# Patient Record
Sex: FEMALE | Race: WHITE | NOT HISPANIC OR LATINO | Employment: FULL TIME | URBAN - METROPOLITAN AREA
[De-identification: names, ages, dates, MRNs, and addresses within clinical notes are randomized per-mention and may not be internally consistent; named-entity substitution may affect disease eponyms.]

---

## 2017-07-18 ENCOUNTER — HOSPITAL ENCOUNTER (OUTPATIENT)
Dept: FAMILY MEDICINE CLINIC | Facility: CLINIC | Age: 47
Setting detail: SPECIMEN
Discharge: HOME | End: 2017-07-18
Attending: INTERNAL MEDICINE | Admitting: INTERNAL MEDICINE

## 2017-07-19 LAB
BASOPHILS # BLD AUTO: 0.1 10*3/UL (ref 0–0.2)
BASOPHILS NFR BLD AUTO: 1 % (ref 0–2)
DIFFERENTIAL METHOD BLD: (no result)
EOSINOPHIL # BLD AUTO: 0.3 10*3/UL (ref 0–0.3)
EOSINOPHIL # BLD AUTO: 3 % (ref 0–3)
ERYTHROCYTE [DISTWIDTH] IN BLOOD BY AUTOMATED COUNT: 12.9 % (ref 11.5–14.5)
HCT VFR BLD AUTO: 41.9 % (ref 35–49)
HGB BLD-MCNC: 14.2 G/DL (ref 12–15)
LYMPHOCYTES # BLD AUTO: 2.1 10*3/UL (ref 0.8–4.8)
LYMPHOCYTES NFR BLD AUTO: 22 % (ref 18–42)
MCH RBC QN AUTO: 32 PG (ref 26–32)
MCHC RBC AUTO-ENTMCNC: 33.8 G/DL (ref 32–36)
MCV RBC AUTO: 94.5 FL (ref 80–94)
MONOCYTES # BLD AUTO: 0.5 10*3/UL (ref 0.1–1.3)
MONOCYTES NFR BLD AUTO: 5 % (ref 2–11)
NEUTROPHILS # BLD AUTO: 6.7 10*3/UL (ref 2.3–8.6)
NEUTROPHILS NFR BLD AUTO: 69 % (ref 50–75)
NRBC BLD AUTO-RTO: 0 /100{WBCS}
NRBC/RBC NFR BLD MANUAL: 0 10*3/UL
PLATELET # BLD AUTO: 298 10*3/UL (ref 150–450)
PMV BLD AUTO: 7.9 FL (ref 7.4–10.4)
RBC # BLD AUTO: 4.43 10*6/UL (ref 4–5.4)
T3 SERPL-MCNC: 0.83 NG/ML (ref 0.87–1.78)
T4 FREE SERPL-MCNC: 0.72 NG/DL (ref 0.58–1.64)
TSH SERPL-ACNC: 3.93 UIU/ML (ref 0.34–5.6)
WBC # BLD AUTO: 9.7 10*3/UL (ref 4.5–11.5)

## 2018-02-20 ENCOUNTER — HOSPITAL ENCOUNTER (OUTPATIENT)
Dept: FAMILY MEDICINE CLINIC | Facility: CLINIC | Age: 48
Setting detail: SPECIMEN
Discharge: HOME | End: 2018-02-20
Attending: INTERNAL MEDICINE | Admitting: INTERNAL MEDICINE

## 2018-02-20 LAB
BACTERIA SPEC AEROBE CULT: NORMAL
Lab: NORMAL
MICRO REPORT STATUS: NORMAL
SPECIMEN SOURCE: NORMAL

## 2019-07-30 ENCOUNTER — TELEPHONE (OUTPATIENT)
Dept: FAMILY MEDICINE CLINIC | Facility: CLINIC | Age: 49
End: 2019-07-30

## 2019-08-15 ENCOUNTER — OFFICE VISIT (OUTPATIENT)
Dept: FAMILY MEDICINE CLINIC | Facility: CLINIC | Age: 49
End: 2019-08-15

## 2019-08-15 ENCOUNTER — LAB (OUTPATIENT)
Dept: FAMILY MEDICINE CLINIC | Facility: CLINIC | Age: 49
End: 2019-08-15

## 2019-08-15 VITALS
HEART RATE: 78 BPM | HEIGHT: 67 IN | OXYGEN SATURATION: 98 % | BODY MASS INDEX: 29.76 KG/M2 | WEIGHT: 189.6 LBS | DIASTOLIC BLOOD PRESSURE: 60 MMHG | TEMPERATURE: 99.1 F | SYSTOLIC BLOOD PRESSURE: 114 MMHG | RESPIRATION RATE: 16 BRPM

## 2019-08-15 DIAGNOSIS — E03.9 ACQUIRED HYPOTHYROIDISM: Primary | ICD-10-CM

## 2019-08-15 DIAGNOSIS — E04.1 THYROID NODULE: ICD-10-CM

## 2019-08-15 DIAGNOSIS — D35.1 PARATHYROID ADENOMA: ICD-10-CM

## 2019-08-15 DIAGNOSIS — E03.9 ACQUIRED HYPOTHYROIDISM: ICD-10-CM

## 2019-08-15 LAB
ALBUMIN SERPL-MCNC: 4.2 G/DL (ref 3.5–4.8)
ALBUMIN/GLOB SERPL: 1.5 G/DL (ref 1–1.7)
ALP SERPL-CCNC: 59 U/L (ref 32–91)
ALT SERPL W P-5'-P-CCNC: 12 U/L (ref 14–54)
ANION GAP SERPL CALCULATED.3IONS-SCNC: 13.6 MMOL/L (ref 5–15)
AST SERPL-CCNC: 15 U/L (ref 15–41)
BILIRUB SERPL-MCNC: 0.6 MG/DL (ref 0.3–1.2)
BUN BLD-MCNC: 11 MG/DL (ref 8–20)
BUN/CREAT SERPL: 12.2 (ref 5.4–26.2)
CA-I SERPL ISE-MCNC: 1.29 MMOL/L (ref 1.2–1.3)
CALCIUM SPEC-SCNC: 9.3 MG/DL (ref 8.9–10.3)
CHLORIDE SERPL-SCNC: 103 MMOL/L (ref 101–111)
CO2 SERPL-SCNC: 26 MMOL/L (ref 22–32)
CREAT BLD-MCNC: 0.9 MG/DL (ref 0.4–1)
GFR SERPL CREATININE-BSD FRML MDRD: 67 ML/MIN/1.73
GLOBULIN UR ELPH-MCNC: 2.8 GM/DL (ref 2.5–3.8)
GLUCOSE BLD-MCNC: 84 MG/DL (ref 65–99)
POTASSIUM BLD-SCNC: 3.6 MMOL/L (ref 3.6–5.1)
PROT SERPL-MCNC: 7 G/DL (ref 6.1–7.9)
SODIUM BLD-SCNC: 139 MMOL/L (ref 136–144)
T4 FREE SERPL-MCNC: 0.93 NG/DL (ref 0.58–1.64)
TSH SERPL DL<=0.05 MIU/L-ACNC: 5.87 MIU/ML (ref 0.34–5.6)

## 2019-08-15 PROCEDURE — 80053 COMPREHEN METABOLIC PANEL: CPT | Performed by: INTERNAL MEDICINE

## 2019-08-15 PROCEDURE — 84443 ASSAY THYROID STIM HORMONE: CPT | Performed by: INTERNAL MEDICINE

## 2019-08-15 PROCEDURE — 84439 ASSAY OF FREE THYROXINE: CPT | Performed by: INTERNAL MEDICINE

## 2019-08-15 PROCEDURE — 36415 COLL VENOUS BLD VENIPUNCTURE: CPT | Performed by: INTERNAL MEDICINE

## 2019-08-15 PROCEDURE — 99213 OFFICE O/P EST LOW 20 MIN: CPT | Performed by: INTERNAL MEDICINE

## 2019-08-15 PROCEDURE — 82330 ASSAY OF CALCIUM: CPT | Performed by: INTERNAL MEDICINE

## 2019-08-15 RX ORDER — LEVOTHYROXINE AND LIOTHYRONINE 19; 4.5 UG/1; UG/1
30 TABLET ORAL DAILY
Qty: 30 TABLET | Refills: 5 | Status: SHIPPED | OUTPATIENT
Start: 2019-08-15 | End: 2020-02-02

## 2019-08-15 NOTE — PROGRESS NOTES
"Rooming Tab(CC,VS,Pt Hx,Fall Screen)  Chief Complaint   Patient presents with   • Hypothyroidism       Subjective    Pt here for thyroid follow up- has not been on meds for 3 weeks-  Insurance not paying for synthroid anymore and the generic caused her issues.  More swelling-  And more tired in last couple of weeks.  Sister recently had parathyroid removed- and mother did 20 years ago both had benign adenomas.   No constipation-  But drinks 2 cups of coffee.   I have reviewed and updated her medications, medical history and problem list during today's office visit.     Patient Care Team:  Selam Hernandez MD as PCP - General (Internal Medicine)    Problem List Tab  Medications Tab  Synopsis Tab  Chart Review Tab  Care Everywhere Tab  Immunizations Tab  Patient History Tab    Social History     Tobacco Use   • Smoking status: Not on file   Substance Use Topics   • Alcohol use: Not on file       Review of Systems   Constitutional: Negative for fatigue.   Respiratory: Negative for wheezing.    Cardiovascular: Positive for palpitations and leg swelling. Negative for chest pain.   Gastrointestinal: Negative for GERD.       Objective     Rooming Tab(CC,VS,Pt Hx,Fall Screen)  /60 (BP Location: Left arm, Patient Position: Sitting)   Pulse 78   Temp 99.1 °F (37.3 °C) (Oral)   Resp 16   Ht 169.2 cm (66.6\")   Wt 86 kg (189 lb 9.6 oz)   LMP 08/08/2019 (Exact Date)   SpO2 98%   BMI 30.05 kg/m²     Body mass index is 30.05 kg/m².    Physical Exam   Constitutional: She appears well-developed and well-nourished.   HENT:   Head: Normocephalic.   Eyes: Pupils are equal, round, and reactive to light.   Cardiovascular: Normal rate and regular rhythm.   No murmur heard.  Pulmonary/Chest: Effort normal and breath sounds normal. No respiratory distress.   Abdominal: Soft. Bowel sounds are normal.   Skin: Skin is warm.   Nursing note and vitals reviewed.       Statin Choice Calculator  Data Reviewed:               Lab " Results   Component Value Date    BUN 11 08/15/2019    CREATININE 0.90 08/15/2019    EGFRIFNONA 67 08/15/2019     08/15/2019    K 3.6 08/15/2019     08/15/2019    CALCIUM 9.3 08/15/2019    ALBUMIN 4.20 08/15/2019    BILITOT 0.6 08/15/2019    ALKPHOS 59 08/15/2019    AST 15 08/15/2019    ALT 12 (L) 08/15/2019    TSH 5.870 (H) 08/15/2019    FREET4 0.93 08/15/2019      Assessment/Plan   Order Review Tab  Health Maintenance Tab  Patient Plan/Order Tab  Diagnoses and all orders for this visit:    1. Acquired hypothyroidism (Primary)  -     TSH  -     T4, free; Future  -     Comprehensive Metabolic Panel  -     Calcium, Ionized    2. Parathyroid adenoma  -     Calcium, Ionized    3. Thyroid nodule    Other orders  -     Thyroid (ARMOUR THYROID) 30 MG PO tablet; Take 1 tablet by mouth Daily.  Dispense: 30 tablet; Refill: 5        Wrapup Tab  Return in about 6 months (around 2/15/2020).       They were informed of the diagnosis and treatment plan and directed to f/u for any further problems or concerns.

## 2020-01-23 ENCOUNTER — TELEPHONE (OUTPATIENT)
Dept: FAMILY MEDICINE CLINIC | Facility: CLINIC | Age: 50
End: 2020-01-23

## 2020-01-23 DIAGNOSIS — E03.9 ACQUIRED HYPOTHYROIDISM: Primary | ICD-10-CM

## 2020-01-23 NOTE — TELEPHONE ENCOUNTER
Patient scheduled a physical and pap on 3/27, the first available.  She feels she needs to have her thyroid labwork done sooner under [facility] because her thyroid is off and she knows her dose needs to be adjusted.  Will you order [facility] labwork?  Let me know so I can call her back.

## 2020-01-29 ENCOUNTER — LAB (OUTPATIENT)
Dept: FAMILY MEDICINE CLINIC | Facility: CLINIC | Age: 50
End: 2020-01-29

## 2020-01-29 DIAGNOSIS — E03.9 ACQUIRED HYPOTHYROIDISM: ICD-10-CM

## 2020-01-29 PROCEDURE — 84443 ASSAY THYROID STIM HORMONE: CPT | Performed by: INTERNAL MEDICINE

## 2020-01-29 PROCEDURE — 84439 ASSAY OF FREE THYROXINE: CPT | Performed by: INTERNAL MEDICINE

## 2020-01-30 LAB
T4 FREE SERPL-MCNC: 1.09 NG/DL (ref 0.93–1.7)
TSH SERPL DL<=0.05 MIU/L-ACNC: 5.15 UIU/ML (ref 0.27–4.2)

## 2020-02-02 RX ORDER — LEVOTHYROXINE AND LIOTHYRONINE 19; 4.5 UG/1; UG/1
45 TABLET ORAL DAILY
Qty: 45 TABLET | Refills: 3 | Status: SHIPPED | OUTPATIENT
Start: 2020-02-02 | End: 2021-07-16

## 2020-02-02 NOTE — PROGRESS NOTES
Please call the patient regarding her abnormal result. Thyroid is still under active will send in Rx for higher armour to pharmacy

## 2020-03-27 ENCOUNTER — OFFICE VISIT (OUTPATIENT)
Dept: FAMILY MEDICINE CLINIC | Facility: CLINIC | Age: 50
End: 2020-03-27

## 2020-03-27 ENCOUNTER — LAB (OUTPATIENT)
Dept: FAMILY MEDICINE CLINIC | Facility: CLINIC | Age: 50
End: 2020-03-27

## 2020-03-27 VITALS
TEMPERATURE: 97.8 F | BODY MASS INDEX: 33.78 KG/M2 | OXYGEN SATURATION: 98 % | DIASTOLIC BLOOD PRESSURE: 74 MMHG | SYSTOLIC BLOOD PRESSURE: 106 MMHG | HEART RATE: 83 BPM | WEIGHT: 215.2 LBS | RESPIRATION RATE: 16 BRPM | HEIGHT: 67 IN

## 2020-03-27 DIAGNOSIS — Z01.419 NORMAL PELVIC EXAM: ICD-10-CM

## 2020-03-27 DIAGNOSIS — E03.9 ACQUIRED HYPOTHYROIDISM: ICD-10-CM

## 2020-03-27 DIAGNOSIS — Z00.00 PREVENTATIVE HEALTH CARE: Primary | ICD-10-CM

## 2020-03-27 DIAGNOSIS — E55.9 VITAMIN D DEFICIENCY DISEASE: ICD-10-CM

## 2020-03-27 LAB
BILIRUB BLD-MCNC: NEGATIVE MG/DL
CLARITY, POC: CLEAR
COLOR UR: YELLOW
GLUCOSE UR STRIP-MCNC: NEGATIVE MG/DL
KETONES UR QL: NEGATIVE
LEUKOCYTE EST, POC: ABNORMAL
NITRITE UR-MCNC: NEGATIVE MG/ML
PH UR: 7 [PH] (ref 5–8)
PROT UR STRIP-MCNC: NEGATIVE MG/DL
RBC # UR STRIP: ABNORMAL /UL
SP GR UR: 1.01 (ref 1–1.03)
UROBILINOGEN UR QL: NORMAL

## 2020-03-27 PROCEDURE — 90715 TDAP VACCINE 7 YRS/> IM: CPT | Performed by: INTERNAL MEDICINE

## 2020-03-27 PROCEDURE — 80061 LIPID PANEL: CPT | Performed by: INTERNAL MEDICINE

## 2020-03-27 PROCEDURE — 80053 COMPREHEN METABOLIC PANEL: CPT | Performed by: INTERNAL MEDICINE

## 2020-03-27 PROCEDURE — 90471 IMMUNIZATION ADMIN: CPT | Performed by: INTERNAL MEDICINE

## 2020-03-27 PROCEDURE — 81003 URINALYSIS AUTO W/O SCOPE: CPT | Performed by: INTERNAL MEDICINE

## 2020-03-27 PROCEDURE — 96372 THER/PROPH/DIAG INJ SC/IM: CPT | Performed by: INTERNAL MEDICINE

## 2020-03-27 PROCEDURE — 84439 ASSAY OF FREE THYROXINE: CPT | Performed by: INTERNAL MEDICINE

## 2020-03-27 PROCEDURE — 36415 COLL VENOUS BLD VENIPUNCTURE: CPT | Performed by: INTERNAL MEDICINE

## 2020-03-27 PROCEDURE — 82306 VITAMIN D 25 HYDROXY: CPT | Performed by: INTERNAL MEDICINE

## 2020-03-27 PROCEDURE — 84443 ASSAY THYROID STIM HORMONE: CPT | Performed by: INTERNAL MEDICINE

## 2020-03-27 PROCEDURE — 99396 PREV VISIT EST AGE 40-64: CPT | Performed by: INTERNAL MEDICINE

## 2020-03-27 RX ORDER — CYANOCOBALAMIN 1000 UG/ML
1000 INJECTION, SOLUTION INTRAMUSCULAR; SUBCUTANEOUS
Status: SHIPPED | OUTPATIENT
Start: 2020-03-27

## 2020-03-27 RX ADMIN — CYANOCOBALAMIN 1000 MCG: 1000 INJECTION, SOLUTION INTRAMUSCULAR; SUBCUTANEOUS at 14:53

## 2020-03-27 NOTE — PROGRESS NOTES
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  Has new grandchild that lives with them.    increased weight gain    getting hot flashes and night sweats comes and goes- more dryness. Now.   no depression- some anxiety but manageable.   I have reviewed and updated her medications, medical history and problem list during today's office visit.     Patient Care Team:  Selam Hernandez MD as PCP - General (Internal Medicine)    Problem List Tab  Medications Tab  Synopsis Tab  Chart Review Tab  Care Everywhere Tab  Immunizations Tab  Patient History Tab    Social History     Tobacco Use   • Smoking status: Never Smoker   • Smokeless tobacco: Never Used   Substance Use Topics   • Alcohol use: Not on file       Review of Systems   Constitutional: Positive for fatigue and unexpected weight gain. Negative for fever.   HENT: Negative for congestion.    Respiratory: Negative for apnea, cough and wheezing.    Cardiovascular: Negative for chest pain.   Gastrointestinal: Negative for abdominal distention.   Musculoskeletal: Positive for arthralgias.   Neurological: Negative for syncope.   Psychiatric/Behavioral: Negative for behavioral problems and sleep disturbance.       Objective     Rooming Tab(CC,VS,Pt Hx,Fall Screen)  /74 (BP Location: Left arm, Patient Position: Sitting, Cuff Size: Large Adult)   Pulse 83   Temp 97.8 °F (36.6 °C) (Oral)   Resp 16   Ht 170.2 cm (67\")   Wt 97.6 kg (215 lb 3.2 oz)   SpO2 98%   BMI 33.71 kg/m²     Body mass index is 33.71 kg/m².    Physical Exam   Constitutional: She is oriented to person, place, and time. She appears well-developed and well-nourished.   HENT:   Head: Normocephalic and atraumatic.   Right Ear: External ear normal.   Left Ear: External ear normal.   Mouth/Throat: Oropharynx is clear and moist.   Eyes: " Pupils are equal, round, and reactive to light. Conjunctivae and EOM are normal.   Neck: Normal range of motion. Neck supple.   Cardiovascular: Normal rate, regular rhythm, normal heart sounds and intact distal pulses.   Pulmonary/Chest: Effort normal and breath sounds normal. Right breast exhibits no inverted nipple, no mass and no tenderness. Left breast exhibits no inverted nipple, no mass and no tenderness. No breast discharge.   Abdominal: Soft. Bowel sounds are normal. There is no tenderness.   Genitourinary: Rectal exam shows no mass. No breast discharge. There is no rash, tenderness or lesion on the right labia. Uterus is not enlarged. Cervix exhibits no motion tenderness. Right adnexum displays no fullness. Left adnexum displays no fullness.   Musculoskeletal: Normal range of motion.   Neurological: She is oriented to person, place, and time.   Skin: Skin is warm.   Psychiatric: She has a normal mood and affect.        Statin Choice Calculator  Data Reviewed:               Lab Results   Component Value Date    TSH 5.150 (H) 01/29/2020    FREET4 1.09 01/29/2020      Assessment/Plan   Order Review Tab  Health Maintenance Tab  Patient Plan/Order Tab  Diagnoses and all orders for this visit:    1. Preventative health care (Primary)  Assessment & Plan:  discussed all recommendations    Orders:  -     Comprehensive Metabolic Panel  -     Lipid Panel  -     cyanocobalamin injection 1,000 mcg  -     Mammo Screening Digital Tomosynthesis Bilateral With CAD; Future    2. Acquired hypothyroidism  Comments:  to change back to synthroid awaiting lab results  Orders:  -     TSH  -     T4, free; Future    3. Normal pelvic exam  -     POC Urinalysis Dipstick, Automated  -     IGP,Aptima HPV,Age Gdln; Future    4. Vitamin D deficiency disease  -     Vitamin D 25 Hydroxy    Other orders  -     Tdap Vaccine Greater Than or Equal To 8yo IM      Wrapup Tab  Return in about 1 year (around 3/27/2021) for Annual physical.        During this visit for their annual exam, we reviewed their personal history, social history and family history.  We went over their medications and all the recommended health maintenence items for their age group. They were given the opportunity to ask questions and discuss other concerns.

## 2020-03-28 LAB
25(OH)D3 SERPL-MCNC: 22 NG/ML (ref 30–100)
ALBUMIN SERPL-MCNC: 4.4 G/DL (ref 3.5–5.2)
ALBUMIN/GLOB SERPL: 1.6 G/DL
ALP SERPL-CCNC: 66 U/L (ref 39–117)
ALT SERPL W P-5'-P-CCNC: 16 U/L (ref 1–33)
ANION GAP SERPL CALCULATED.3IONS-SCNC: 11.3 MMOL/L (ref 5–15)
AST SERPL-CCNC: 11 U/L (ref 1–32)
BILIRUB SERPL-MCNC: 0.3 MG/DL (ref 0.2–1.2)
BUN BLD-MCNC: 11 MG/DL (ref 6–20)
BUN/CREAT SERPL: 14.7 (ref 7–25)
CALCIUM SPEC-SCNC: 9.1 MG/DL (ref 8.6–10.5)
CHLORIDE SERPL-SCNC: 103 MMOL/L (ref 98–107)
CHOLEST SERPL-MCNC: 177 MG/DL (ref 0–200)
CO2 SERPL-SCNC: 23.7 MMOL/L (ref 22–29)
CREAT BLD-MCNC: 0.75 MG/DL (ref 0.57–1)
GFR SERPL CREATININE-BSD FRML MDRD: 82 ML/MIN/1.73
GLOBULIN UR ELPH-MCNC: 2.8 GM/DL
GLUCOSE BLD-MCNC: 89 MG/DL (ref 65–99)
HDLC SERPL-MCNC: 70 MG/DL (ref 40–60)
LDLC SERPL CALC-MCNC: 89 MG/DL (ref 0–100)
LDLC/HDLC SERPL: 1.27 {RATIO}
POTASSIUM BLD-SCNC: 4.2 MMOL/L (ref 3.5–5.2)
PROT SERPL-MCNC: 7.2 G/DL (ref 6–8.5)
SODIUM BLD-SCNC: 138 MMOL/L (ref 136–145)
T4 FREE SERPL-MCNC: 1.19 NG/DL (ref 0.93–1.7)
TRIGL SERPL-MCNC: 91 MG/DL (ref 0–150)
TSH SERPL DL<=0.05 MIU/L-ACNC: 4.96 UIU/ML (ref 0.27–4.2)
VLDLC SERPL-MCNC: 18.2 MG/DL (ref 5–40)

## 2020-03-30 ENCOUNTER — TELEPHONE (OUTPATIENT)
Dept: FAMILY MEDICINE CLINIC | Facility: CLINIC | Age: 50
End: 2020-03-30

## 2020-03-30 RX ORDER — LEVOTHYROXINE SODIUM 0.1 MG/1
100 TABLET ORAL DAILY
Qty: 90 TABLET | Refills: 1 | Status: SHIPPED | OUTPATIENT
Start: 2020-03-30 | End: 2020-10-05

## 2020-03-30 NOTE — TELEPHONE ENCOUNTER
----- Message from Selam Hernandez MD sent at 3/30/2020 10:45 AM EDT -----  Please call the patient regarding her abnormal result. Please tell pt that thyroid is still underactive- so changing to 100mcg of synthroid- wants to recheck in 8-12 weeks.   Patients Vitamin D  Is low.  They need to start 5000 units daily of D3.  Can get OTC and recheck in 3 months

## 2020-03-30 NOTE — PROGRESS NOTES
Please call the patient regarding her abnormal result. Please tell pt that thyroid is still underactive- so changing to 100mcg of synthroid- wants to recheck in 8-12 weeks.   Patients Vitamin D  Is low.  They need to start 5000 units daily of D3.  Can get OTC and recheck in 3 months

## 2020-03-31 LAB
AGE GDLN ACOG TESTING: NORMAL
CHROM ANALY OVERALL INTERP-IMP: NORMAL
CONV .: NORMAL
CONV PERFORMED BY:: NORMAL
DX ICD CODE: NORMAL
HIV 1 & 2 AB SER-IMP: NORMAL
HPV I/H RISK 4 DNA CVX QL PROBE+SIG AMP: NEGATIVE
REF LAB TEST METHOD: NORMAL
STAT OF ADQ CVX/VAG CYTO-IMP: NORMAL

## 2020-10-05 RX ORDER — LEVOTHYROXINE SODIUM 0.1 MG/1
TABLET ORAL
Qty: 90 TABLET | Refills: 1 | Status: SHIPPED | OUTPATIENT
Start: 2020-10-05 | End: 2021-04-23

## 2021-03-02 ENCOUNTER — LAB (OUTPATIENT)
Dept: FAMILY MEDICINE CLINIC | Facility: CLINIC | Age: 51
End: 2021-03-02

## 2021-03-02 ENCOUNTER — TELEPHONE (OUTPATIENT)
Dept: FAMILY MEDICINE CLINIC | Facility: CLINIC | Age: 51
End: 2021-03-02

## 2021-03-02 ENCOUNTER — OFFICE VISIT (OUTPATIENT)
Dept: FAMILY MEDICINE CLINIC | Facility: CLINIC | Age: 51
End: 2021-03-02

## 2021-03-02 VITALS
HEART RATE: 109 BPM | WEIGHT: 218.4 LBS | HEIGHT: 67 IN | RESPIRATION RATE: 10 BRPM | SYSTOLIC BLOOD PRESSURE: 115 MMHG | TEMPERATURE: 97.1 F | BODY MASS INDEX: 34.28 KG/M2 | DIASTOLIC BLOOD PRESSURE: 79 MMHG

## 2021-03-02 DIAGNOSIS — N93.8 DUB (DYSFUNCTIONAL UTERINE BLEEDING): ICD-10-CM

## 2021-03-02 DIAGNOSIS — E53.8 B12 DEFICIENCY: ICD-10-CM

## 2021-03-02 DIAGNOSIS — Z01.419 ENCOUNTER FOR ANNUAL ROUTINE GYNECOLOGICAL EXAMINATION: Primary | ICD-10-CM

## 2021-03-02 DIAGNOSIS — E04.1 THYROID NODULE: ICD-10-CM

## 2021-03-02 DIAGNOSIS — E03.9 ACQUIRED HYPOTHYROIDISM: ICD-10-CM

## 2021-03-02 DIAGNOSIS — Z01.419 ENCOUNTER FOR ANNUAL ROUTINE GYNECOLOGICAL EXAMINATION: ICD-10-CM

## 2021-03-02 LAB
ALBUMIN SERPL-MCNC: 4.3 G/DL (ref 3.5–5.2)
ALBUMIN/GLOB SERPL: 1.7 G/DL
ALP SERPL-CCNC: 70 U/L (ref 39–117)
ALT SERPL W P-5'-P-CCNC: 14 U/L (ref 1–33)
ANION GAP SERPL CALCULATED.3IONS-SCNC: 8.9 MMOL/L (ref 5–15)
AST SERPL-CCNC: 11 U/L (ref 1–32)
BASOPHILS # BLD AUTO: 0.03 10*3/MM3 (ref 0–0.2)
BASOPHILS NFR BLD AUTO: 0.4 % (ref 0–1.5)
BILIRUB SERPL-MCNC: 0.4 MG/DL (ref 0–1.2)
BUN SERPL-MCNC: 13 MG/DL (ref 6–20)
BUN/CREAT SERPL: 18.6 (ref 7–25)
CALCIUM SPEC-SCNC: 9.6 MG/DL (ref 8.6–10.5)
CHLORIDE SERPL-SCNC: 102 MMOL/L (ref 98–107)
CO2 SERPL-SCNC: 27.1 MMOL/L (ref 22–29)
CREAT SERPL-MCNC: 0.7 MG/DL (ref 0.57–1)
DEPRECATED RDW RBC AUTO: 42.7 FL (ref 37–54)
EOSINOPHIL # BLD AUTO: 0.1 10*3/MM3 (ref 0–0.4)
EOSINOPHIL NFR BLD AUTO: 1.3 % (ref 0.3–6.2)
ERYTHROCYTE [DISTWIDTH] IN BLOOD BY AUTOMATED COUNT: 12.1 % (ref 12.3–15.4)
GFR SERPL CREATININE-BSD FRML MDRD: 89 ML/MIN/1.73
GLOBULIN UR ELPH-MCNC: 2.5 GM/DL
GLUCOSE SERPL-MCNC: 92 MG/DL (ref 65–99)
HCT VFR BLD AUTO: 43.1 % (ref 34–46.6)
HGB BLD-MCNC: 14.6 G/DL (ref 12–15.9)
IMM GRANULOCYTES # BLD AUTO: 0.01 10*3/MM3 (ref 0–0.05)
IMM GRANULOCYTES NFR BLD AUTO: 0.1 % (ref 0–0.5)
LYMPHOCYTES # BLD AUTO: 2.41 10*3/MM3 (ref 0.7–3.1)
LYMPHOCYTES NFR BLD AUTO: 32 % (ref 19.6–45.3)
MCH RBC QN AUTO: 32.6 PG (ref 26.6–33)
MCHC RBC AUTO-ENTMCNC: 33.9 G/DL (ref 31.5–35.7)
MCV RBC AUTO: 96.2 FL (ref 79–97)
MONOCYTES # BLD AUTO: 0.48 10*3/MM3 (ref 0.1–0.9)
MONOCYTES NFR BLD AUTO: 6.4 % (ref 5–12)
NEUTROPHILS NFR BLD AUTO: 4.51 10*3/MM3 (ref 1.7–7)
NEUTROPHILS NFR BLD AUTO: 59.8 % (ref 42.7–76)
NRBC BLD AUTO-RTO: 0 /100 WBC (ref 0–0.2)
PLATELET # BLD AUTO: 293 10*3/MM3 (ref 140–450)
PMV BLD AUTO: 9 FL (ref 6–12)
POTASSIUM SERPL-SCNC: 4.1 MMOL/L (ref 3.5–5.2)
PROT SERPL-MCNC: 6.8 G/DL (ref 6–8.5)
RBC # BLD AUTO: 4.48 10*6/MM3 (ref 3.77–5.28)
SODIUM SERPL-SCNC: 138 MMOL/L (ref 136–145)
T4 FREE SERPL-MCNC: 1.37 NG/DL (ref 0.93–1.7)
TSH SERPL DL<=0.05 MIU/L-ACNC: 3.14 UIU/ML (ref 0.27–4.2)
WBC # BLD AUTO: 7.54 10*3/MM3 (ref 3.4–10.8)

## 2021-03-02 PROCEDURE — 84439 ASSAY OF FREE THYROXINE: CPT | Performed by: INTERNAL MEDICINE

## 2021-03-02 PROCEDURE — 99396 PREV VISIT EST AGE 40-64: CPT | Performed by: INTERNAL MEDICINE

## 2021-03-02 PROCEDURE — 36415 COLL VENOUS BLD VENIPUNCTURE: CPT

## 2021-03-02 PROCEDURE — 80053 COMPREHEN METABOLIC PANEL: CPT | Performed by: INTERNAL MEDICINE

## 2021-03-02 PROCEDURE — 84443 ASSAY THYROID STIM HORMONE: CPT | Performed by: INTERNAL MEDICINE

## 2021-03-02 PROCEDURE — 85025 COMPLETE CBC W/AUTO DIFF WBC: CPT | Performed by: INTERNAL MEDICINE

## 2021-03-02 RX ORDER — CYANOCOBALAMIN 1000 UG/ML
1000 INJECTION, SOLUTION INTRAMUSCULAR; SUBCUTANEOUS
Status: SHIPPED | OUTPATIENT
Start: 2021-03-02

## 2021-03-02 NOTE — PROGRESS NOTES
"Rooming Tab(CC,VS,Pt Hx,Fall Screen)  Chief Complaint   Patient presents with   • Vaginal Bleeding       Subjective   Pt has been regular menses- last year seemed to get lighter but in last 6 weeks much more bleeding continuously- not huge clots.  Felt like normal period-this past week as even heavier. Feels lower back pain and abdomen heaviness- stools normal- goes daily but maybe more constipated- harder.   Some pain with deep pentration.    more fatigued-  Tyroid enlarged, and hard to lose weight  I have reviewed and updated her medications, medical history and problem list during today's office visit.     Patient Care Team:  Selam Hernandez MD as PCP - General (Internal Medicine)    Problem List Tab  Medications Tab  Synopsis Tab  Chart Review Tab  Care Everywhere Tab  Immunizations Tab  Patient History Tab    Social History     Tobacco Use   • Smoking status: Never Smoker   • Smokeless tobacco: Never Used   Substance Use Topics   • Alcohol use: Not on file       Review of Systems    Objective     Rooming Tab(CC,VS,Pt Hx,Fall Screen)  /79   Pulse 109   Temp 97.1 °F (36.2 °C)   Resp 10   Ht 170.2 cm (67\")   Wt 99.1 kg (218 lb 6.4 oz)   BMI 34.21 kg/m²     Body mass index is 34.21 kg/m².    Physical Exam  Vitals and nursing note reviewed.   Constitutional:       Appearance: Normal appearance. She is well-developed. She is obese.   HENT:      Head: Normocephalic and atraumatic.      Right Ear: Tympanic membrane and external ear normal.      Left Ear: Tympanic membrane and external ear normal.      Nose: No rhinorrhea.      Mouth/Throat:      Pharynx: No posterior oropharyngeal erythema.   Eyes:      Conjunctiva/sclera: Conjunctivae normal.      Pupils: Pupils are equal, round, and reactive to light.   Neck:      Comments: Right thyroid enlarged  Cardiovascular:      Rate and Rhythm: Normal rate and regular rhythm.      Pulses: Normal pulses.      Heart sounds: Normal heart sounds. No murmur. "   Pulmonary:      Effort: Pulmonary effort is normal.      Breath sounds: Normal breath sounds.   Chest:      Breasts:         Right: No inverted nipple, mass or tenderness.         Left: No inverted nipple, mass or tenderness.   Abdominal:      General: Bowel sounds are normal. There is no distension.      Palpations: Abdomen is soft.      Tenderness: There is no abdominal tenderness.   Genitourinary:     Labia:         Right: No rash, tenderness or lesion.       Cervix: No cervical motion tenderness.      Uterus: Not enlarged.       Adnexa:         Right: No fullness.          Left: No fullness.        Rectum: No mass.   Musculoskeletal:         General: No tenderness.      Cervical back: Normal range of motion and neck supple.   Skin:     Capillary Refill: Capillary refill takes less than 2 seconds.      Findings: No erythema.   Neurological:      General: No focal deficit present.      Mental Status: She is alert and oriented to person, place, and time.   Psychiatric:         Mood and Affect: Mood normal.         Behavior: Behavior normal.          Statin Choice Calculator  Data Reviewed:               Lab Results   Component Value Date    BUN 13 03/02/2021    CREATININE 0.70 03/02/2021    EGFRIFNONA 89 03/02/2021     03/02/2021    K 4.1 03/02/2021     03/02/2021    CALCIUM 9.6 03/02/2021    ALBUMIN 4.30 03/02/2021    BILITOT 0.4 03/02/2021    ALKPHOS 70 03/02/2021    AST 11 03/02/2021    ALT 14 03/02/2021    WBC 7.54 03/02/2021    RBC 4.48 03/02/2021    HCT 43.1 03/02/2021    MCV 96.2 03/02/2021    MCH 32.6 03/02/2021    TSH 3.140 03/02/2021    FREET4 1.37 03/02/2021      Assessment/Plan   Order Review Tab  Health Maintenance Tab  Patient Plan/Order Tab  Diagnoses and all orders for this visit:    1. Encounter for annual routine gynecological examination (Primary)  Comments:  discussed all recommendations  Orders:  -     IGP,Aptima HPV,Age Gdln; Future    2. DUB (dysfunctional uterine  bleeding)  Comments:  will check uterine US adn refer to gyn if needed  Orders:  -     US Pelvis Complete; Future  -     IGP,Aptima HPV,Age Gdln; Future  -     Comprehensive Metabolic Panel  -     CBC Auto Differential    3. Thyroid nodule  Comments:  check thryoid US  Orders:  -     US Thyroid; Future    4. Acquired hypothyroidism  -     TSH  -     T4, Free    5. B12 deficiency  -     cyanocobalamin injection 1,000 mcg    Other orders  -     progesterone (PROMETRIUM) 100 MG capsule; Take 1 capsule by mouth Daily.  Dispense: 10 capsule; Refill: 1        Wrapup Tab  Return if symptoms worsen or fail to improve.       They were informed of the diagnosis and treatment plan and directed to f/u for any further problems or concerns.

## 2021-03-05 LAB
AGE GDLN ACOG TESTING: NORMAL
CYTOLOGIST CVX/VAG CYTO: NORMAL
CYTOLOGY CVX/VAG DOC CYTO: NORMAL
CYTOLOGY CVX/VAG DOC THIN PREP: NORMAL
DX ICD CODE: NORMAL
HIV 1 & 2 AB SER-IMP: NORMAL
HPV I/H RISK 4 DNA CVX QL PROBE+SIG AMP: NEGATIVE
OTHER STN SPEC: NORMAL
STAT OF ADQ CVX/VAG CYTO-IMP: NORMAL

## 2021-03-11 DIAGNOSIS — E04.1 THYROID NODULE: ICD-10-CM

## 2021-03-11 DIAGNOSIS — N93.8 DUB (DYSFUNCTIONAL UTERINE BLEEDING): ICD-10-CM

## 2021-03-12 NOTE — PROGRESS NOTES
Please mkae sure pt can get in mychart and saw my note    The nodules on the right have grown. IT is not recommended to do a biopsy until 1cm- so I would repeat the US in 1 year and biopsy if at that time they are > 1 cm.  Pelvic US showed thickened endometrium so need to see gyn for endometrial biopsy and possibly D and C

## 2021-03-12 NOTE — PROGRESS NOTES
The nodules on the right of thyroid have grown. IT is not recommended to do a biopsy until 1cm- so I would repeat the US in 1 year and biopsy if at that time they are > 1 cm.  Pelvic US showed thickened endometrium so need to see gyn for endometrial biopsy and possibly D and C.  IF you need a referral to gyn, please let me know

## 2021-03-17 ENCOUNTER — TELEPHONE (OUTPATIENT)
Dept: FAMILY MEDICINE CLINIC | Facility: CLINIC | Age: 51
End: 2021-03-17

## 2021-03-17 NOTE — TELEPHONE ENCOUNTER
----- Message from Gabriel Don sent at 3/17/2021  3:13 PM EDT -----  Regarding: RE:bleeding  Contact: [phone]  Thanks!  I called Dr. Wyatt's office today and they did not have the referral yet and said she was about 6 weeks out, so I made an appointment with Leonardo Richter for next week.  If I need to reschedule that with Dr. Wyatt, just let me know...thanks again!

## 2021-04-23 RX ORDER — LEVOTHYROXINE SODIUM 0.1 MG/1
TABLET ORAL
Qty: 90 TABLET | Refills: 3 | Status: SHIPPED | OUTPATIENT
Start: 2021-04-23 | End: 2022-03-28

## 2021-05-23 ENCOUNTER — PATIENT MESSAGE (OUTPATIENT)
Dept: FAMILY MEDICINE CLINIC | Facility: CLINIC | Age: 51
End: 2021-05-23

## 2021-05-24 RX ORDER — DOXYCYCLINE HYCLATE 100 MG
100 TABLET ORAL
Qty: 20 TABLET | Refills: 0 | Status: SHIPPED | OUTPATIENT
Start: 2021-05-24 | End: 2021-06-03

## 2021-05-25 NOTE — TELEPHONE ENCOUNTER
From: Gabriel Don  To: Selam Hernandez MD  Sent: 5/23/2021 5:47 PM EDT  Subject: Non-Urgent Medical Question    Hi Dr. Hernandez,    I found a tick on me a few days ago that had been there a while. I've actually had several ticks on me this year, but usually find them and pull them off quickly. I haven't been feeling great the past couple of days. 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 Since it's the last week of school, I won't be able to come in until next week if you would need to see me, or calling it in is fine with me. My pharmacist is [facility] on [address] in [place].     Thanks and Congratulations to your daughter on an awesome field hockey season. (Karen Dolan filled me in on the details!)

## 2021-09-03 ENCOUNTER — TELEPHONE (OUTPATIENT)
Dept: FAMILY MEDICINE CLINIC | Facility: CLINIC | Age: 51
End: 2021-09-03

## 2021-09-03 NOTE — TELEPHONE ENCOUNTER
Caller: Davis Orosco    Relationship: Self    Best call back number: [phone]    What is the best time to reach you: ANYTIME    Who are you requesting to speak with (clinical staff, provider,  specific staff member): CLINICAL STAFF    Do you know the name of the person who called: DAVIS OROSCO    What was the call regarding: PATIENT SAID SHE WAS SEEN IN THE [facility]RGENT [facility] ABOUT 10 DAYS AGO AND DIAGNOSED WITH A URINARY TRACT INFECTION    SHE SAID THE CULTURE DID COME BACK POSITIVE FOR BACTERIA, SHE HAS BEEN TAKING A 10 DAY ANTIBIOTIC, BUT HAS NOW COMPLETED IT    SHE SAID SHE IS STILL HAVING A LOT OF PAIN WHEN USING THE BATHROOM, WANTED TO SEE IF SHE COULD BE SEEN NEXT WEEK FOR THIS OR IF SHE SHOULD DO SOMETHING ELSE     SH DID NOT DESCRIBE ANY SYMPTOMS OTHER THAN PAIN WHEN URINATING     PHARMACY: [facility] - [place], [place] [address] - [phone] [facility] [phone]   [phone]    Do you require a callback: YES

## 2021-09-13 ENCOUNTER — OFFICE VISIT (OUTPATIENT)
Dept: FAMILY MEDICINE CLINIC | Facility: CLINIC | Age: 51
End: 2021-09-13

## 2021-09-13 VITALS
SYSTOLIC BLOOD PRESSURE: 104 MMHG | RESPIRATION RATE: 10 BRPM | WEIGHT: 209.8 LBS | HEIGHT: 67 IN | DIASTOLIC BLOOD PRESSURE: 70 MMHG | HEART RATE: 73 BPM | BODY MASS INDEX: 32.93 KG/M2 | OXYGEN SATURATION: 99 %

## 2021-09-13 DIAGNOSIS — R39.15 URINARY URGENCY: ICD-10-CM

## 2021-09-13 DIAGNOSIS — R33.9 INCOMPLETE BLADDER EMPTYING: ICD-10-CM

## 2021-09-13 DIAGNOSIS — R30.0 DYSURIA: Primary | ICD-10-CM

## 2021-09-13 PROCEDURE — 99213 OFFICE O/P EST LOW 20 MIN: CPT | Performed by: NURSE PRACTITIONER

## 2021-09-13 PROCEDURE — 81003 URINALYSIS AUTO W/O SCOPE: CPT | Performed by: NURSE PRACTITIONER

## 2021-09-13 NOTE — PATIENT INSTRUCTIONS
Urinary Tract Infection, Adult    A urinary tract infection (UTI) is an infection of any part of the urinary tract. The urinary tract includes the kidneys, ureters, bladder, and urethra. These organs make, store, and get rid of urine in the body.  Your health care provider may use other names to describe the infection. An upper UTI affects the ureters and kidneys (pyelonephritis). A lower UTI affects the bladder (cystitis) and urethra (urethritis).  What are the causes?  Most urinary tract infections are caused by bacteria in your genital area, around the entrance to your urinary tract (urethra). These bacteria grow and cause inflammation of your urinary tract.  What increases the risk?  You are more likely to develop this condition if:  · You have a urinary catheter that stays in place (indwelling).  · You are not able to control when you urinate or have a bowel movement (you have incontinence).  · You are female and you:  ? Use a spermicide or diaphragm for birth control.  ? Have low estrogen levels.  ? Are pregnant.  · You have certain genes that increase your risk (genetics).  · You are sexually active.  · You take antibiotic medicines.  · You have a condition that causes your flow of urine to slow down, such as:  ? An enlarged prostate, if you are male.  ? Blockage in your urethra (stricture).  ? A kidney stone.  ? A nerve condition that affects your bladder control (neurogenic bladder).  ? Not getting enough to drink, or not urinating often.  · You have certain medical conditions, such as:  ? Diabetes.  ? A weak disease-fighting system (immunesystem).  ? Sickle cell disease.  ? Gout.  ? Spinal cord injury.  What are the signs or symptoms?  Symptoms of this condition include:  · Needing to urinate right away (urgently).  · Frequent urination or passing small amounts of urine frequently.  · Pain or burning with urination.  · Blood in the urine.  · Urine that smells bad or unusual.  · Trouble urinating.  · Cloudy  urine.  · Vaginal discharge, if you are female.  · Pain in the abdomen or the lower back.  You may also have:  · Vomiting or a decreased appetite.  · Confusion.  · Irritability or tiredness.  · A fever.  · Diarrhea.  The first symptom in older adults may be confusion. In some cases, they may not have any symptoms until the infection has worsened.  How is this diagnosed?  This condition is diagnosed based on your medical history and a physical exam. You may also have other tests, including:  · Urine tests.  · Blood tests.  · Tests for sexually transmitted infections (STIs).  If you have had more than one UTI, a cystoscopy or imaging studies may be done to determine the cause of the infections.  How is this treated?  Treatment for this condition includes:  · Antibiotic medicine.  · Over-the-counter medicines to treat discomfort.  · Drinking enough water to stay hydrated.  If you have frequent infections or have other conditions such as a kidney stone, you may need to see a health care provider who specializes in the urinary tract (urologist).  In rare cases, urinary tract infections can cause sepsis. Sepsis is a life-threatening condition that occurs when the body responds to an infection. Sepsis is treated in the hospital with IV antibiotics, fluids, and other medicines.  Follow these instructions at home:    Medicines  · Take over-the-counter and prescription medicines only as told by your health care provider.  · If you were prescribed an antibiotic medicine, take it as told by your health care provider. Do not stop using the antibiotic even if you start to feel better.  General instructions  · Make sure you:  ? Empty your bladder often and completely. Do not hold urine for long periods of time.  ? Empty your bladder after sex.  ? Wipe from front to back after a bowel movement if you are female. Use each tissue one time when you wipe.  · Drink enough fluid to keep your urine pale yellow.  · Keep all follow-up  visits as told by your health care provider. This is important.  Contact a health care provider if:  · Your symptoms do not get better after 1-2 days.  · Your symptoms go away and then return.  Get help right away if you have:  · Severe pain in your back or your lower abdomen.  · A fever.  · Nausea or vomiting.  Summary  · A urinary tract infection (UTI) is an infection of any part of the urinary tract, which includes the kidneys, ureters, bladder, and urethra.  · Most urinary tract infections are caused by bacteria in your genital area, around the entrance to your urinary tract (urethra).  · Treatment for this condition often includes antibiotic medicines.  · If you were prescribed an antibiotic medicine, take it as told by your health care provider. Do not stop using the antibiotic even if you start to feel better.  · Keep all follow-up visits as told by your health care provider. This is important.  This information is not intended to replace advice given to you by your health care provider. Make sure you discuss any questions you have with your health care provider.  Document Revised: 12/05/2019 Document Reviewed: 06/27/2019  [employer] Patient Education © 2021 [employer]

## 2021-09-13 NOTE — PROGRESS NOTES
"Chief Complaint  Urinary Tract Infection    Subjective          Gabriel YARED Don presents to [facility]  History of Present Illness  H/o uti 3-4 weeks ago, treated at urgent care (nitrofurantoin x 10 days) but continues to have some urinary symptoms and  low back pain which is constant, worse when she has to go to the bathroom  Has some urinary urgency with incontinence/leaking  Also feels like she is not emptying her bladder all of the way  Denies fever  Has had uti in the past and this feels is the same    Is trying to push fluids to help flush the bladder  Has used some azo when she had the initial symptoms    Dip u/a + blood, otherwise negative, send for c & s  Tells me that she always has blood in her urine    Review of Systems   Constitutional: Negative for fever.   Respiratory: Negative.    Gastrointestinal: Negative.    Genitourinary: Positive for dysuria, flank pain, frequency and urgency. Negative for decreased urine volume, hematuria and pelvic pain.   Neurological: Negative.        Objective   Vital Signs:   /70   Pulse 73   Resp 10   Ht 168.9 cm (66.5\")   Wt 95.2 kg (209 lb 12.8 oz)   SpO2 99%   BMI 33.36 kg/m²     Physical Exam  Vitals reviewed.   Constitutional:       Appearance: Normal appearance.   Cardiovascular:      Rate and Rhythm: Normal rate and regular rhythm.      Heart sounds: Normal heart sounds.   Pulmonary:      Effort: Pulmonary effort is normal.      Breath sounds: Normal breath sounds.   Abdominal:      General: Bowel sounds are normal.      Palpations: Abdomen is soft.      Tenderness: There is right CVA tenderness and left CVA tenderness.   Musculoskeletal:      Right lower leg: No edema.      Left lower leg: No edema.   Skin:     Capillary Refill: Capillary refill takes less than 2 seconds.   Neurological:      Mental Status: She is alert and oriented to person, place, and time.        Result Review :                 Assessment and Plan  "   Diagnoses and all orders for this visit:    1. Dysuria (Primary)  -     Urinalysis With Culture If Indicated -; Future    check urine c & s, encouraged push po fluids    Follow Up   Return if symptoms worsen or fail to improve.  Patient was given instructions and counseling regarding her condition or for health maintenance advice. Please see specific information pulled into the AVS if appropriate.

## 2021-09-14 LAB
BILIRUB UR QL STRIP: NEGATIVE
CLARITY UR: ABNORMAL
COLOR UR: YELLOW
GLUCOSE UR STRIP-MCNC: NEGATIVE MG/DL
HGB UR QL STRIP.AUTO: NEGATIVE
KETONES UR QL STRIP: ABNORMAL
LEUKOCYTE ESTERASE UR QL STRIP.AUTO: NEGATIVE
NITRITE UR QL STRIP: NEGATIVE
PH UR STRIP.AUTO: 8.5 [PH] (ref 5–8)
PROT UR QL STRIP: ABNORMAL
SP GR UR STRIP: 1.03 (ref 1–1.03)
UROBILINOGEN UR QL STRIP: ABNORMAL

## 2021-12-09 ENCOUNTER — PATIENT MESSAGE (OUTPATIENT)
Dept: FAMILY MEDICINE CLINIC | Facility: CLINIC | Age: 51
End: 2021-12-09

## 2021-12-10 RX ORDER — CIPROFLOXACIN 500 MG/1
500 TABLET, FILM COATED ORAL 2 TIMES DAILY
Qty: 10 TABLET | Refills: 0 | Status: SHIPPED | OUTPATIENT
Start: 2021-12-10

## 2021-12-10 NOTE — TELEPHONE ENCOUNTER
From: Gabriel Don  To: Selam Hernandez MD  Sent: 12/9/2021 10:30 PM EST  Subject: EEEK! Another bladder infection!    Hi Dr. Hernandez,    I pray that all is well with you. I have a bladder infection. This is my third one since having Covid in July. Not for sure if there's any correlation. I just had a CT scan, scope, and other tests done on my bladder and kidneys bc of reoccurring blood in my urine and constant urine leakage. My results were clear.    I am experiencing lots of pain (pinching/burning) when I urinate, and also cloudy and foul smelling urine. I am having some relief with Azo Standard and Tylenol. I tried to get in at Urgent Care, but they were booked on the day that I was able to go. Is there any possible way that your could call me in a prescription? I would never normally ask this without an appointment, but I know that I won't be able to secure a spot. 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       Thank you for considering and God bless!    gabriel    * I am allergic to sulfa antibiotics.     -

## 2021-12-22 ENCOUNTER — LAB (OUTPATIENT)
Dept: FAMILY MEDICINE CLINIC | Facility: CLINIC | Age: 51
End: 2021-12-22

## 2021-12-22 DIAGNOSIS — R30.0 DYSURIA: Primary | ICD-10-CM

## 2021-12-22 LAB
BACTERIA UR QL AUTO: NORMAL /HPF
BILIRUB UR QL STRIP: NEGATIVE
CLARITY UR: CLEAR
COLOR UR: YELLOW
GLUCOSE UR STRIP-MCNC: NEGATIVE MG/DL
HGB UR QL STRIP.AUTO: ABNORMAL
HYALINE CASTS UR QL AUTO: NORMAL /LPF
KETONES UR QL STRIP: NEGATIVE
LEUKOCYTE ESTERASE UR QL STRIP.AUTO: NEGATIVE
NITRITE UR QL STRIP: NEGATIVE
PH UR STRIP.AUTO: 6 [PH] (ref 5–8)
PROT UR QL STRIP: NEGATIVE
RBC # UR STRIP: NORMAL /HPF
REF LAB TEST METHOD: NORMAL
SP GR UR STRIP: 1.01 (ref 1–1.03)
SQUAMOUS #/AREA URNS HPF: NORMAL /HPF
UROBILINOGEN UR QL STRIP: ABNORMAL
WBC # UR STRIP: NORMAL /HPF

## 2021-12-22 PROCEDURE — 81001 URINALYSIS AUTO W/SCOPE: CPT | Performed by: INTERNAL MEDICINE

## 2022-03-28 RX ORDER — LEVOTHYROXINE SODIUM 100 UG/1
TABLET ORAL
Qty: 90 TABLET | Refills: 0 | Status: SHIPPED | OUTPATIENT
Start: 2022-03-28 | End: 2022-07-14

## 2022-07-14 RX ORDER — LEVOTHYROXINE SODIUM 100 UG/1
TABLET ORAL
Qty: 90 TABLET | Refills: 0 | Status: SHIPPED | OUTPATIENT
Start: 2022-07-14 | End: 2022-10-26

## 2022-10-26 RX ORDER — LEVOTHYROXINE SODIUM 0.1 MG/1
TABLET ORAL
Qty: 90 TABLET | Refills: 0 | Status: SHIPPED | OUTPATIENT
Start: 2022-10-26 | End: 2023-01-24

## 2022-12-07 ENCOUNTER — PATIENT MESSAGE (OUTPATIENT)
Dept: FAMILY MEDICINE CLINIC | Facility: CLINIC | Age: 52
End: 2022-12-07

## 2022-12-07 RX ORDER — VALACYCLOVIR HYDROCHLORIDE 500 MG/1
500 TABLET, FILM COATED ORAL 2 TIMES DAILY
Qty: 60 TABLET | Refills: 0 | Status: SHIPPED | OUTPATIENT
Start: 2022-12-07

## 2022-12-08 NOTE — TELEPHONE ENCOUNTER
From: Gabriel Don  To: Selam Hernandez MD  Sent: 12/7/2022 10:04 AM EST  Subject: Valacyclovir Prescription     Hi Dr. Hernandez,    I pray that this email finds you well. I recently was given a dose of Valacyclovir for a cold sore and it worked great. 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 Would there be anyway that you could call me in a prescription for this at the [facility]?    It would be so helpful.....thank you and blessings over you and your family!    Gabriel

## 2023-01-24 RX ORDER — LEVOTHYROXINE SODIUM 0.1 MG/1
TABLET ORAL
Qty: 90 TABLET | Refills: 0 | Status: SHIPPED | OUTPATIENT
Start: 2023-01-24

## 2023-01-24 NOTE — TELEPHONE ENCOUNTER
Patient needs an appt with Dr. Hernandez for thyroid check up needs this appt  Within 90 days for refills

## 2023-01-26 NOTE — TELEPHONE ENCOUNTER
HUB TO SHARE: SENT FOLLOWING [facility] MESSAGE. You will need to see Dr Hernandez for your thyroid checkup before we can refill your medication in April. Please call the office at [phone] to schedule an appointment before the 90 day supply will run out.

## 2023-05-09 RX ORDER — LEVOTHYROXINE SODIUM 0.1 MG/1
100 TABLET ORAL DAILY
Qty: 30 TABLET | Refills: 0 | Status: SHIPPED | OUTPATIENT
Start: 2023-05-09

## 2023-05-30 ENCOUNTER — OFFICE VISIT (OUTPATIENT)
Dept: FAMILY MEDICINE CLINIC | Facility: CLINIC | Age: 53
End: 2023-05-30

## 2023-05-30 VITALS
OXYGEN SATURATION: 97 % | DIASTOLIC BLOOD PRESSURE: 80 MMHG | SYSTOLIC BLOOD PRESSURE: 118 MMHG | HEART RATE: 82 BPM | RESPIRATION RATE: 18 BRPM | HEIGHT: 66 IN | BODY MASS INDEX: 34.39 KG/M2 | WEIGHT: 214 LBS

## 2023-05-30 DIAGNOSIS — E04.1 THYROID NODULE: ICD-10-CM

## 2023-05-30 DIAGNOSIS — E55.9 VITAMIN D DEFICIENCY DISEASE: ICD-10-CM

## 2023-05-30 DIAGNOSIS — Z12.12 SCREENING FOR COLORECTAL CANCER: ICD-10-CM

## 2023-05-30 DIAGNOSIS — E53.8 VITAMIN B12 DEFICIENCY: ICD-10-CM

## 2023-05-30 DIAGNOSIS — Z00.00 PREVENTATIVE HEALTH CARE: Primary | ICD-10-CM

## 2023-05-30 DIAGNOSIS — E03.9 ACQUIRED HYPOTHYROIDISM: ICD-10-CM

## 2023-05-30 DIAGNOSIS — Z12.11 SCREENING FOR COLORECTAL CANCER: ICD-10-CM

## 2023-05-30 PROCEDURE — 96372 THER/PROPH/DIAG INJ SC/IM: CPT | Performed by: INTERNAL MEDICINE

## 2023-05-30 PROCEDURE — 99396 PREV VISIT EST AGE 40-64: CPT | Performed by: INTERNAL MEDICINE

## 2023-05-30 RX ORDER — CLOBETASOL PROPIONATE 0.5 MG/G
1 CREAM TOPICAL 2 TIMES DAILY
Qty: 45 G | Refills: 1 | Status: SHIPPED | OUTPATIENT
Start: 2023-05-30

## 2023-05-30 RX ORDER — CYANOCOBALAMIN 1000 UG/ML
1000 INJECTION, SOLUTION INTRAMUSCULAR; SUBCUTANEOUS
Status: SHIPPED | OUTPATIENT
Start: 2023-05-30

## 2023-05-30 RX ADMIN — CYANOCOBALAMIN 1000 MCG: 1000 INJECTION, SOLUTION INTRAMUSCULAR; SUBCUTANEOUS at 17:04

## 2023-05-30 NOTE — PROGRESS NOTES
Rooming Tab(CC,VS,Pt Hx,Fall Screen)  Chief Complaint   Patient presents with    Annual Exam     Thyroid check       Subjective    The patient presents for an annual exam.      Health maintenance  The patient reports that she is doing well overall. She had a Pap smear with Dr. Rendon. The patient had been bleeding nonstop for 8 to 9 days, and Dr. Rendon had prescribed progesterone for her. The patient is on her week off progesterone right now. She was put on birth control where she takes it every day. She has been experiencing headaches recently, and she never has headaches. She is fatigued constantly. The patient takes Valtrex when she has a cold sore, but she has not needed it for a while. She denies any issues with bladder infections. Dr. Rendon ordered her mammogram. She denies any depression or anxiety. The patient always has joint pain. She has gained quite a bit of weight. Her goal this summer is to get back to ambulating and try to lose some weight. She notices that, when she eats a lot of processed foods, she experiences joint pain. She has gained approximately 25 pounds in the last 2 years, and the patient believes that it is due to stress. The patient thinks this could also be due to menopause. She denies any chest pain or dyspnea. She denies any abdominal pain. The patient has bowel movements most days. She still has her gallbladder. She denies any new moles that she is concerned about. She has not had a colonoscopy yet.    Varicose veins  The patient has varicose veins and psoriasis which is new which has been causing psoriasis. If her lower extremities continue to swell, she will see a vascular surgeon.     I have reviewed and updated her medications, medical history and problem list during today's office visit.     Patient Care Team:  Selam Hernandez MD as PCP - General (Internal Medicine)    Problem List Tab  Medications Tab  Synopsis Tab  Chart Review Tab  Care Everywhere Tab  Immunizations  "Tab  Patient History Tab    Social History     Tobacco Use    Smoking status: Never    Smokeless tobacco: Never   Substance Use Topics    Alcohol use: Not on file       Review of Systems    Objective     Rooming Tab(CC,VS,Pt Hx,Fall Screen)  /80 (BP Location: Left arm, Patient Position: Sitting)   Pulse 82   Resp 18   Ht 168.9 cm (66.5\")   Wt 97.1 kg (214 lb)   SpO2 97%   BMI 34.03 kg/m²     Body mass index is 34.03 kg/m².    Physical Exam  Vitals and nursing note reviewed.   Constitutional:       Appearance: Normal appearance. She is well-developed.   HENT:      Head: Normocephalic and atraumatic.      Right Ear: Tympanic membrane normal.      Left Ear: Tympanic membrane normal.      Nose: No rhinorrhea.      Mouth/Throat:      Pharynx: No posterior oropharyngeal erythema.   Eyes:      Pupils: Pupils are equal, round, and reactive to light.   Cardiovascular:      Rate and Rhythm: Normal rate and regular rhythm.      Pulses: Normal pulses.      Heart sounds: Normal heart sounds. No murmur heard.  Pulmonary:      Effort: Pulmonary effort is normal.      Breath sounds: Normal breath sounds.   Abdominal:      General: Bowel sounds are normal. There is no distension.      Palpations: Abdomen is soft.   Musculoskeletal:         General: No tenderness.      Cervical back: Normal range of motion and neck supple.   Skin:     Capillary Refill: Capillary refill takes less than 2 seconds.   Neurological:      Mental Status: She is alert and oriented to person, place, and time.   Psychiatric:         Mood and Affect: Mood normal.         Behavior: Behavior normal.        Statin Choice Calculator  Data Reviewed:         The data below has been reviewed by Selam Hernandez MD on 05/30/2023.      Lab Results   Component Value Date    BUN 14 05/31/2023    CREATININE 0.81 05/31/2023     05/31/2023    K 4.5 05/31/2023     05/31/2023    CALCIUM 9.7 05/31/2023    ALBUMIN 4.2 05/31/2023    BILITOT 0.4 " 05/31/2023    ALKPHOS 61 05/31/2023    AST 14 05/31/2023    ALT 10 05/31/2023    TRIG 70 05/31/2023    HDL 51 05/31/2023    VLDL 13 05/31/2023     (H) 05/31/2023    LDLHDL 2.24 05/31/2023    WBC 5.55 05/31/2023    RBC 4.58 05/31/2023    HCT 43.0 05/31/2023    MCV 93.9 05/31/2023    MCH 31.4 05/31/2023    TSH 5.300 (H) 05/31/2023    FREET4 1.42 05/31/2023    [id number] 30.2 05/31/2023      Assessment & Plan   Order Review Tab  Health Maintenance Tab  Patient Plan/Order Tab  Diagnoses and all orders for this visit:    1. Preventative health care (Primary)  Comments:  discussed all recommendations  Orders:  -     Comprehensive Metabolic Panel; Future  -     CBC Auto Differential; Future  -     Lipid Panel; Future    2. Acquired hypothyroidism  Comments:  check levels and adjust as needed  Orders:  -     TSH; Future  -     T4, Free; Future  -     cyanocobalamin injection 1,000 mcg    3. Vitamin D deficiency disease  -     Vitamin D,25-Hydroxy; Future    4. Thyroid nodule  -     US Thyroid; Future    5. Screening for colorectal cancer  -     Ambulatory Referral For Screening Colonoscopy    6. Vitamin B12 deficiency    Other orders  -     clobetasol prop emollient base (TEMOVATE) 0.05 % emollient cream; Apply 1 application topically to the appropriate area as directed 2 (Two) Times a Day.  Dispense: 45 g; Refill: 1  -     levothyroxine (Synthroid) 112 MCG tablet; Take 1 tablet by mouth Daily.  Dispense: 90 tablet; Refill: 1    1. Hypothyroidism  - We will check her thyroid levels.    2. Psoriasis  - We will prescribe her a cream.    3. Varicose veins  - We will refer her to a vascular surgeon.    Wrapup Tab  Return in about 1 year (around 5/30/2024), or if symptoms worsen or fail to improve.         During this visit for their annual exam, we reviewed their personal history, social history and family history.  We went over their medications and all the recommended health maintenence items for their age group. They  were given the opportunity to ask questions and discuss other concerns.    Transcribed from ambient dictation for Selam Hernandez MD by Rosanne Robles.  05/30/23   19:38 EDT    Patient or patient representative verbalized consent to the visit recording.  I have personally performed the services described in this document as transcribed by the above individual, and it is both accurate and complete.  Selam Hernandez MD  6/4/2023  22:57 EDT

## 2023-05-31 ENCOUNTER — LAB (OUTPATIENT)
Dept: FAMILY MEDICINE CLINIC | Facility: CLINIC | Age: 53
End: 2023-05-31

## 2023-05-31 DIAGNOSIS — E03.9 ACQUIRED HYPOTHYROIDISM: ICD-10-CM

## 2023-05-31 DIAGNOSIS — Z00.00 PREVENTATIVE HEALTH CARE: ICD-10-CM

## 2023-05-31 DIAGNOSIS — E55.9 VITAMIN D DEFICIENCY DISEASE: ICD-10-CM

## 2023-05-31 LAB
25(OH)D3 SERPL-MCNC: 30.2 NG/ML (ref 30–100)
ALBUMIN SERPL-MCNC: 4.2 G/DL (ref 3.5–5.2)
ALBUMIN/GLOB SERPL: 1.4 G/DL
ALP SERPL-CCNC: 61 U/L (ref 39–117)
ALT SERPL W P-5'-P-CCNC: 10 U/L (ref 1–33)
ANION GAP SERPL CALCULATED.3IONS-SCNC: 12.1 MMOL/L (ref 5–15)
AST SERPL-CCNC: 14 U/L (ref 1–32)
BASOPHILS # BLD AUTO: 0.04 10*3/MM3 (ref 0–0.2)
BASOPHILS NFR BLD AUTO: 0.7 % (ref 0–1.5)
BILIRUB SERPL-MCNC: 0.4 MG/DL (ref 0–1.2)
BUN SERPL-MCNC: 14 MG/DL (ref 6–20)
BUN/CREAT SERPL: 17.3 (ref 7–25)
CALCIUM SPEC-SCNC: 9.7 MG/DL (ref 8.6–10.5)
CHLORIDE SERPL-SCNC: 104 MMOL/L (ref 98–107)
CHOLEST SERPL-MCNC: 179 MG/DL (ref 0–200)
CO2 SERPL-SCNC: 23.9 MMOL/L (ref 22–29)
CREAT SERPL-MCNC: 0.81 MG/DL (ref 0.57–1)
DEPRECATED RDW RBC AUTO: 41.7 FL (ref 37–54)
EGFRCR SERPLBLD CKD-EPI 2021: 87.5 ML/MIN/1.73
EOSINOPHIL # BLD AUTO: 0.12 10*3/MM3 (ref 0–0.4)
EOSINOPHIL NFR BLD AUTO: 2.2 % (ref 0.3–6.2)
ERYTHROCYTE [DISTWIDTH] IN BLOOD BY AUTOMATED COUNT: 12.2 % (ref 12.3–15.4)
GLOBULIN UR ELPH-MCNC: 3 GM/DL
GLUCOSE SERPL-MCNC: 84 MG/DL (ref 65–99)
HCT VFR BLD AUTO: 43 % (ref 34–46.6)
HDLC SERPL-MCNC: 51 MG/DL (ref 40–60)
HGB BLD-MCNC: 14.4 G/DL (ref 12–15.9)
IMM GRANULOCYTES # BLD AUTO: 0.01 10*3/MM3 (ref 0–0.05)
IMM GRANULOCYTES NFR BLD AUTO: 0.2 % (ref 0–0.5)
LDLC SERPL CALC-MCNC: 115 MG/DL (ref 0–100)
LDLC/HDLC SERPL: 2.24 {RATIO}
LYMPHOCYTES # BLD AUTO: 1.36 10*3/MM3 (ref 0.7–3.1)
LYMPHOCYTES NFR BLD AUTO: 24.5 % (ref 19.6–45.3)
MCH RBC QN AUTO: 31.4 PG (ref 26.6–33)
MCHC RBC AUTO-ENTMCNC: 33.5 G/DL (ref 31.5–35.7)
MCV RBC AUTO: 93.9 FL (ref 79–97)
MONOCYTES # BLD AUTO: 0.37 10*3/MM3 (ref 0.1–0.9)
MONOCYTES NFR BLD AUTO: 6.7 % (ref 5–12)
NEUTROPHILS NFR BLD AUTO: 3.65 10*3/MM3 (ref 1.7–7)
NEUTROPHILS NFR BLD AUTO: 65.7 % (ref 42.7–76)
NRBC BLD AUTO-RTO: 0 /100 WBC (ref 0–0.2)
PLATELET # BLD AUTO: 292 10*3/MM3 (ref 140–450)
PMV BLD AUTO: 9.2 FL (ref 6–12)
POTASSIUM SERPL-SCNC: 4.5 MMOL/L (ref 3.5–5.2)
PROT SERPL-MCNC: 7.2 G/DL (ref 6–8.5)
RBC # BLD AUTO: 4.58 10*6/MM3 (ref 3.77–5.28)
SODIUM SERPL-SCNC: 140 MMOL/L (ref 136–145)
T4 FREE SERPL-MCNC: 1.42 NG/DL (ref 0.93–1.7)
TRIGL SERPL-MCNC: 70 MG/DL (ref 0–150)
TSH SERPL DL<=0.05 MIU/L-ACNC: 5.3 UIU/ML (ref 0.27–4.2)
VLDLC SERPL-MCNC: 13 MG/DL (ref 5–40)
WBC NRBC COR # BLD: 5.55 10*3/MM3 (ref 3.4–10.8)

## 2023-05-31 PROCEDURE — 36415 COLL VENOUS BLD VENIPUNCTURE: CPT

## 2023-05-31 PROCEDURE — 82306 VITAMIN D 25 HYDROXY: CPT | Performed by: INTERNAL MEDICINE

## 2023-05-31 PROCEDURE — 80061 LIPID PANEL: CPT | Performed by: INTERNAL MEDICINE

## 2023-05-31 PROCEDURE — 80050 GENERAL HEALTH PANEL: CPT | Performed by: INTERNAL MEDICINE

## 2023-05-31 PROCEDURE — 84439 ASSAY OF FREE THYROXINE: CPT | Performed by: INTERNAL MEDICINE

## 2023-06-01 RX ORDER — LEVOTHYROXINE SODIUM 112 UG/1
112 TABLET ORAL DAILY
Qty: 90 TABLET | Refills: 1 | Status: SHIPPED | OUTPATIENT
Start: 2023-06-01

## 2023-06-01 NOTE — PROGRESS NOTES
Thyroid is a little off- going to increase dose to 112mcg. Also vitamin d is low- take 2000 units of d3 daily.

## 2023-06-04 PROBLEM — Z12.12 SCREENING FOR COLORECTAL CANCER: Status: ACTIVE | Noted: 2020-03-27

## 2023-06-04 PROBLEM — E53.8 VITAMIN B12 DEFICIENCY: Status: ACTIVE | Noted: 2023-06-04

## 2023-06-04 PROBLEM — Z12.11 SCREENING FOR COLORECTAL CANCER: Status: ACTIVE | Noted: 2020-03-27
